# Patient Record
Sex: FEMALE | Race: BLACK OR AFRICAN AMERICAN | NOT HISPANIC OR LATINO | Employment: FULL TIME | URBAN - METROPOLITAN AREA
[De-identification: names, ages, dates, MRNs, and addresses within clinical notes are randomized per-mention and may not be internally consistent; named-entity substitution may affect disease eponyms.]

---

## 2022-03-04 ENCOUNTER — NUTRITION (OUTPATIENT)
Dept: NUTRITION | Facility: CLINIC | Age: 32
End: 2022-03-04
Payer: MEDICAID

## 2022-03-04 VITALS — HEIGHT: 65 IN | BODY MASS INDEX: 14.81 KG/M2 | WEIGHT: 88.88 LBS

## 2022-03-04 DIAGNOSIS — Z71.3 DIETARY COUNSELING: ICD-10-CM

## 2022-03-04 DIAGNOSIS — R63.4 UNINTENTIONAL WEIGHT LOSS: Primary | ICD-10-CM

## 2022-03-04 PROCEDURE — 99999 PR PBB SHADOW E&M-NEW PATIENT-LVL II: CPT | Mod: PBBFAC,,,

## 2022-03-04 PROCEDURE — 99999 PR PBB SHADOW E&M-NEW PATIENT-LVL II: ICD-10-PCS | Mod: PBBFAC,,,

## 2022-03-04 PROCEDURE — 97802 MEDICAL NUTRITION INDIV IN: CPT | Mod: PBBFAC | Performed by: DIETITIAN, REGISTERED

## 2022-03-04 PROCEDURE — 99202 OFFICE O/P NEW SF 15 MIN: CPT | Mod: PBBFAC

## 2022-03-04 NOTE — PATIENT INSTRUCTIONS
1.  Aim for at least 6 meals and snacks each day  2.  Eat more fat (butter, zimmerman, salad dressing, peanut butter)  3.  Choose high-calorie drinks, like juice, regular soda  4.  Eat fruits to be healthy.  5.  Fix up vegetables to add calories and protein (add cheese sauce, butter, margarine, gravy, oil or salad dressing.

## 2022-03-04 NOTE — PROGRESS NOTES
"Referring Physician:Genesis Maxwell MD     Reason for visit:  Chief Complaint   Patient presents with    Weight Loss     unintentional    Nutrition Counseling      Initial Visit    :1990     Allergies Reviewed  Meds Reviewed    Anthropometrics  Weight:40.3 kg (88 lb 13.5 oz) - standing scale  Height:5' 4.5" (1.638 m) - measured in clinic  BMI:Body mass index is 15.01 kg/m².   IBW: 58.3 kg  +/-10%    Meds:  No outpatient medications prior to visit.     No facility-administered medications prior to visit.       Food/Drug Interactions Noted:  n/a    Vitamins/Supplements/Herbs:  MVI, iron, Vit D and Vit E    Labs: n/a     Nutrition Prescription: 1749 Kcals/day( 30 kcal/kg IBW),  58 g protein( 1.0 g/kg IBW)     Support System:  Pt lives with her parents; her mom is with her today.      Diet Hx:   Pt's mom states she was thin like pt prior to pregnancy with pt.  Pt states her goal weight is 120 lbs; however, pt's weight history is uncertain.  Pt states she has never weighed more than 100 lbs.  Pt states she has very good appetite, and never skips a meal.  Eats snacks during the day; likes ice cream, yogurt, shakes.  When asked, pt states she doesn't remember what she had for snack last night.   Doesn't like milk; drinks caffeine-free soft drinks  Pt weighs herself weekly, and keeps a log (which she did not have with her today)    Breakfast: 2 boiled eggs, sausage, cinnamon french toast sticks with syrup, hash browns, mixes protein powder in with Trix yogurt.  Fruit juice.  (when asked, pt states she does eat all of this for breakfast)  Lunch:   Whatever her mom cooks:  She likes beans and rice.  Regular ginger ale.  Dinner:   Last night:  Take out grilled shrimp salad with italian dressing.  Soda.    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  Mom seems very supportive, and pt states her appetite is good.    Nutrition-Focus Physical Findings:  Pt wearing face covering per COVID19 protocol; pt appears very thin      Assessment:  Pt attentive during discussion of ways to increase calories in diet regimen.  She thought this appointment was for some kind of test, so did not eat breakfast this morning.  Pt and her mom verbalized understanding of information, and did not have any nutrition-related questions.    Nutrition Diagnosis:   Underweight RT inadequate energy intake AEB BMI <18.5 (today's BMI = 15)    Recommendations:    1.  Aim for at least 6 meals and snacks each day  2.  Eat more fat (butter, zimmerman, salad dressing, peanut butter)  3.  Choose high-calorie drinks, like juice, regular soda  4.  Eat fruits to be healthy.  5.  Fix up vegetables to add calories and protein (add cheese sauce, butter, margarine, gravy, oil or salad dressing.  Handout provided and reviewed:  Underweight Nutrition Therapy    Strategies Implemented:  Continue current diet regimen    Consultation Time:25 minutes.  Communicated with referring healthcare provider:  Consult note available in pt's Epic chart per MD discretion  Follow Up:  Pt provided with dietitian contact number and advised to call with questions or make future appointment if further intervention needed.